# Patient Record
Sex: FEMALE | Race: WHITE | NOT HISPANIC OR LATINO | URBAN - NONMETROPOLITAN AREA
[De-identification: names, ages, dates, MRNs, and addresses within clinical notes are randomized per-mention and may not be internally consistent; named-entity substitution may affect disease eponyms.]

---

## 2018-06-15 PROBLEM — H52.4: Noted: 2018-12-14

## 2018-06-15 PROBLEM — H10.011: Noted: 2018-12-14

## 2018-06-15 PROBLEM — H40.013: Noted: 2018-06-15

## 2018-06-15 PROBLEM — H18.51: Noted: 2018-12-14

## 2018-06-15 PROBLEM — H16.223: Noted: 2018-12-14

## 2018-06-15 PROBLEM — Z96.1: Noted: 2018-12-14

## 2018-06-15 PROBLEM — H35.373: Noted: 2018-12-14

## 2019-06-25 ENCOUNTER — IMPORTED ENCOUNTER (OUTPATIENT)
Dept: URBAN - NONMETROPOLITAN AREA CLINIC 1 | Facility: CLINIC | Age: 74
End: 2019-06-25

## 2019-06-25 PROCEDURE — 92014 COMPRE OPH EXAM EST PT 1/>: CPT

## 2019-06-25 PROCEDURE — 92250 FUNDUS PHOTOGRAPHY W/I&R: CPT

## 2019-06-25 NOTE — PATIENT DISCUSSION
Glaucoma Suspect OU:  -  Discussed findings w/ pt today-  Family hx of glaucoma in aunt-  VF done previously: reliable OD w/ scattered defects; unreliable OS with central scotoma and scattered defects. -  OCT done previously: borderline NFL thinning superior/inferior OD and borderline NFL thinning OS -  PACH done previously:   and .-  optos done today stable OU-  Cup to Disc noted at 0.4 OU. -  IOP 14 OU -  Continue to monitor Conjunctivochalasis OU-  Discussed findings in detail w/ pt today-  Signs/symptoms associated discussed-  Recommended ATs OU BID PRN-  Discussed surgical evaluation/referral will recommend patient to see Dr. Nathaly Curtis for an evaluation if she would like. -  Monitor PRNP/C IOL OU with PCO-  Discussed findings w/ pt today-  Stable doing well-  PCO noted but stable and no treatment needed at this time -  Continue to monitorERM OU-  Discussed findings w/ pt toda-  Signs/symptoms associated with changes discussed-  OCT done previously shows ERM OU but stable from previous -  Will discuss Amsler Grid use in future when needed. -  Monitor yearly or PRN. Guttata/WILLIAM OU mild-  Discussed findings w/ pt today-  Signs/symptoms associated discussed-  Patient would like to restart Restasis patient would like vials -  May have to use steroid in the future if needed or if flare up. -  Patient may try Jodene Money for redness-  Monitor PRN Presbyopia-  Discussed refractive status in detail w/ pt today-  New glasses Rx given today-  Monitor yearly or PRN; 's Notes: MR - AOA - LRI OD / TORIC OSDFE OCT disc 6/15/18 MAC 12/14/18Optos 6/25/19VF Gonio  12/15/17 baselinePACH 12/15/17 - [facility]

## 2019-12-19 ENCOUNTER — IMPORTED ENCOUNTER (OUTPATIENT)
Dept: URBAN - NONMETROPOLITAN AREA CLINIC 1 | Facility: CLINIC | Age: 74
End: 2019-12-19

## 2019-12-19 PROBLEM — H10.011: Noted: 2019-12-19

## 2019-12-19 PROBLEM — H16.223: Noted: 2019-12-19

## 2019-12-19 PROBLEM — H40.013: Noted: 2019-12-19

## 2019-12-19 PROBLEM — H02.834: Noted: 2019-12-19

## 2019-12-19 PROBLEM — Z96.1: Noted: 2019-12-19

## 2019-12-19 PROBLEM — H02.831: Noted: 2019-12-19

## 2019-12-19 PROBLEM — H35.373: Noted: 2019-12-19

## 2019-12-19 PROCEDURE — 99213 OFFICE O/P EST LOW 20 MIN: CPT

## 2019-12-19 PROCEDURE — 92083 EXTENDED VISUAL FIELD XM: CPT

## 2019-12-19 NOTE — PATIENT DISCUSSION
Glaucoma Suspect OU:  -  Discussed findings w/ pt today-  Family hx of glaucoma in aunt-  VF done today OD shows fair field with Early Superior Arcuate and OS shows Good field with Borderline Inferior Defect-  OCT done previously: borderline NFL thinning superior/inferior OD and borderline NFL thinning OS -  PACH done previously:   and . -  Optos done previously stable OU-  Cup to Disc noted at 0.4 OU. -  IOP 13 OU -  Continue to monitor Dermatochalsis OU-  Discussed findings in detail w/ pt today-  Signs/symptoms associated discussed -  Discussed surgical evaluation/referral will recommend patient to see Dr. Alex Mendoza for an evaluation if she would like. -  Monitor PRNP/C IOL OU with PCO-  Discussed findings w/ pt today-  PCO noted but stable and no treatment needed at this time -  Continue to monitorERM OU-  Discussed findings w/ pt toda-  Signs/symptoms associated with changes discussed-  OCT done previously shows ERM OU but stable from previous -  Will discuss Amsler Grid use in future when needed. -  Monitor yearly or PRN. Guttata/WILLIAM OU mild-  Discussed findings w/ pt today-  Signs/symptoms associated discussed-  Patient would like to restart Restasis patient would like vials -  May have to use steroid in the future if needed or if flare up. -  Patient may try Shani First for redness-  Monitor PRN Presbyopia-  Discussed refractive status in detail w/ pt today-  Monitor yearly or PRN; 's Notes: MR - AOA - LRI OD / TORIC OSDFE OCT disc 6/15/18 MAC 12/14/18Optos 6/25/19VF 12/19/19Gonio  12/15/17 baselinePACH 12/15/17 - [facility]

## 2020-07-01 ENCOUNTER — IMPORTED ENCOUNTER (OUTPATIENT)
Dept: URBAN - NONMETROPOLITAN AREA CLINIC 1 | Facility: CLINIC | Age: 75
End: 2020-07-01

## 2020-07-01 PROCEDURE — 99214 OFFICE O/P EST MOD 30 MIN: CPT

## 2020-07-01 PROCEDURE — 92133 CPTRZD OPH DX IMG PST SGM ON: CPT

## 2020-07-01 NOTE — PATIENT DISCUSSION
Glaucoma Suspect OU:  -  Discussed findings w/ pt today-  Family hx of glaucoma in aunt-  VF done previously OD shows fair field with Early Superior Arcuate and OS shows Good field with Borderline Inferior Defect-  OCT done today OD shows Inferior NFL thinning and Borderline Superior NFL thinning and OS shows Borderline Inferior NFL thinning  -  PACH done previously:   and . -  Optos done previously stable OU-  Cup to Disc noted at 0.4 OU. 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 Gigi Grimaldo for an evaluation if she would like. -  Monitor PRNP/C IOL OU with PCO-  Discuseed diagnosis in detail with patient -  PCO OU noted recommend YAG PC eval with Dr. Gigi Grimaldo or Dr. Jarad Valenzuela. Patient agrees with plan -  Patient complains of trouble with glare and does not like at night time anymore-  Continue to monitorERM OU-  Discussed findings w/ pt toda-  Signs/symptoms associated with changes discussed-  OCT done previously shows ERM OU but stable from previous -  Will discuss Amsler Grid use in future when needed. -  Monitor yearly or PRN. Guttata/WILLIAM OU mild-  Discussed findings w/ pt today-  Signs/symptoms associated discussed-  Patient would like to restart Restasis patient would like vials -  May have to use steroid in the future if needed or if flare up. -  Patient may try Gulshan Aubrey for redness-  Monitor PRN Presbyopia-  Discussed refractive status in detail w/ pt today-  Hold RX at this time -  Monitor yearly or PRN; Dr's Notes: MR - AOA - LRI OD / TORIC OSDFE OCT disc 7/1/20Optos 6/25/19VF 12/19/19Gonio  12/15/17 baselinePACH 12/15/17 - [facility]

## 2020-07-20 ENCOUNTER — IMPORTED ENCOUNTER (OUTPATIENT)
Dept: URBAN - NONMETROPOLITAN AREA CLINIC 1 | Facility: CLINIC | Age: 75
End: 2020-07-20

## 2020-07-20 PROBLEM — H35.373: Noted: 2020-07-20

## 2020-07-20 PROBLEM — Z96.1: Noted: 2020-07-20

## 2020-07-20 PROBLEM — H40.013: Noted: 2020-07-20

## 2020-07-20 PROBLEM — H26.493: Noted: 2020-07-20

## 2020-07-20 PROBLEM — H02.831: Noted: 2020-07-20

## 2020-07-20 PROBLEM — H02.834: Noted: 2020-07-20

## 2020-07-20 PROBLEM — H10.011: Noted: 2020-07-20

## 2020-07-20 PROCEDURE — 92014 COMPRE OPH EXAM EST PT 1/>: CPT

## 2020-07-20 PROCEDURE — 66821 AFTER CATARACT LASER SURGERY: CPT

## 2020-07-20 NOTE — PATIENT DISCUSSION
"PCO-Explained PCO and RBAs of YAG Capsulotomy to pt. -Pt elects to proceed. YAG Caps OD today and YAG Caps OS in 1-2 weeks. --------------------notes below are from previous------------------------------Glaucoma Suspect OU:  -  Discussed findings w/ pt today-  Family hx of glaucoma in aunt-  VF done previously OD shows fair field with Early Superior Arcuate and OS shows Good field with Borderline Inferior Defect-  OCT done today OD shows Inferior NFL thinning and Borderline Superior NFL thinning and OS shows Borderline Inferior NFL thinning  -  PACH done previously:   and . -  Optos done previously stable OU-  Cup to Disc noted at 0.4 OU. 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 Alex Mendoza for an evaluation if she would like. -  Monitor PRNP/C IOL OU with PCO-  Discuseed diagnosis in detail with patient -  PCO OU noted recommend YAG PC eval with Dr. Alex Mendoza or Dr. Marycarmen Lazo. Patient agrees with plan -  Patient complains of trouble with glare and does not like at night time anymore-  Continue to monitorERM OU-  Discussed findings w/ pt toda-  Signs/symptoms associated with changes discussed-  OCT done previously shows ERM OU but stable from previous -  Will discuss Amsler Grid use in future when needed. -  Monitor yearly or PRN. Guttata/WILLIAM OU mild-  Discussed findings w/ pt today-  Signs/symptoms associated discussed-  Patient would like to restart Restasis patient would like vials -  May have to use steroid in the future if needed or if flare up. -  Patient may try Shani First for redness-  Monitor PRN Presbyopia-  Discussed refractive status in detail w/ pt today-  Hold RX at this time -  Monitor yearly or PRN ""; Dr's Notes: MR - AOA - LRI OD / TORIC OSDFE OCT disc 7/1/20Optos 6/25/19VF 12/19/19Gonio  12/15/17 baselinePACH 12/15/17 - [facility]"

## 2020-08-17 ENCOUNTER — IMPORTED ENCOUNTER (OUTPATIENT)
Dept: URBAN - NONMETROPOLITAN AREA CLINIC 1 | Facility: CLINIC | Age: 75
End: 2020-08-17

## 2020-08-17 PROBLEM — Z96.1: Noted: 2020-07-20

## 2020-08-17 PROBLEM — H40.013: Noted: 2020-07-20

## 2020-08-17 PROBLEM — H02.831: Noted: 2020-07-20

## 2020-08-17 PROBLEM — H26.492: Noted: 2020-08-17

## 2020-08-17 PROBLEM — H35.373: Noted: 2020-07-20

## 2020-08-17 PROBLEM — H02.834: Noted: 2020-07-20

## 2020-08-17 PROBLEM — H10.011: Noted: 2020-07-20

## 2020-08-17 PROCEDURE — 66821 AFTER CATARACT LASER SURGERY: CPT

## 2020-08-17 NOTE — PATIENT DISCUSSION
"PCO-Explained PCO and RBAs of YAG Capsulotomy to pt. -Pt elects to proceed. YAG Caps OS today. Written consent signed and obtained prior to procedure-s/p YAG PC OD with open capsule noted and improvement of vision-Pt has multiple occasions complained about her RX glasses and did again today. After looking at her lens there is a distortion of the coating she denies washing with anything other than soap or water. Per patient glasses are about 3years old and bought at another doctors office. Patient to have Refraction @ her POV. She continues to say she sees better without glasses. Informed patient if s/p YAG PC OU she feels she sees more clear without glasses she is to relay that information to Dr Kei Welch and they can discuss wether patient needs new RX or is ok to use otc readers only. --------------------notes below are from previous------------------------------Glaucoma Suspect OU:  -  Discussed findings w/ pt today-  Family hx of glaucoma in aunt-  VF done previously OD shows fair field with Early Superior Arcuate and OS shows Good field with Borderline Inferior Defect-  OCT done today OD shows Inferior NFL thinning and Borderline Superior NFL thinning and OS shows Borderline Inferior NFL thinning  -  PACH done previously:   and . -  Optos done previously stable OU-  Cup to Disc noted at 0.4 OU. 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 Radha Melo for an evaluation if she would like. -  Monitor PRNP/C IOL OU with PCO-  Discuseed diagnosis in detail with patient -  PCO OU noted recommend YAG PC eval with Dr. Radha Melo or Dr. Daisy Paez. Patient agrees with plan -  Patient complains of trouble with glare and does not like at night time anymore-  Continue to monitorERM OU-  Discussed findings w/ pt toda-  Signs/symptoms associated with changes discussed-  OCT done previously shows ERM OU but stable from previous -  Will discuss Amsler Grid use in future when needed. -  Monitor yearly or PRN. Guttata/WILLIAM OU mild-  Discussed findings w/ pt today-  Signs/symptoms associated discussed-  Patient would like to restart Restasis patient would like vials -  May have to use steroid in the future if needed or if flare up. -  Patient may try Alphonza Blower for redness-  Monitor PRN Presbyopia-  Discussed refractive status in detail w/ pt today-  Hold RX at this time -  Monitor yearly or PRN ""; Dr's Notes: MR - AOA - LRI OD / TORIC OSDFE OCT disc 7/1/20Optos 6/25/19VF 12/19/19Gonio  12/15/17 baselinePACH 12/15/17 - [facility]"

## 2020-09-03 ENCOUNTER — IMPORTED ENCOUNTER (OUTPATIENT)
Dept: URBAN - NONMETROPOLITAN AREA CLINIC 1 | Facility: CLINIC | Age: 75
End: 2020-09-03

## 2020-09-03 PROBLEM — Z98.890: Noted: 2020-09-03

## 2020-09-03 PROBLEM — H35.373: Noted: 2020-07-20

## 2020-09-03 PROBLEM — H02.831: Noted: 2020-07-20

## 2020-09-03 PROBLEM — H10.011: Noted: 2020-07-20

## 2020-09-03 PROBLEM — Z96.1: Noted: 2020-07-20

## 2020-09-03 PROBLEM — H40.013: Noted: 2020-07-20

## 2020-09-03 PROBLEM — H02.834: Noted: 2020-07-20

## 2020-09-03 PROCEDURE — 92015 DETERMINE REFRACTIVE STATE: CPT

## 2020-09-03 PROCEDURE — 99024 POSTOP FOLLOW-UP VISIT: CPT

## 2020-09-03 NOTE — PATIENT DISCUSSION
2 Week POV YAG PC OS 08/17/2020 YAG PC OD 07/20/20 w/MR- Discussed diagnosis in detail with patient - S/P YAG PC OU - Good central opening - MR done today and new glasses RX given - Continue to monitor --------------------notes below are from previous------------------------------Glaucoma Suspect OU:  -  Discussed findings w/ pt today-  Family hx of glaucoma in aunt-  VF done previously OD shows fair field with Early Superior Arcuate and OS shows Good field with Borderline Inferior Defect-  OCT done previously OD shows Inferior NFL thinning and Borderline Superior NFL thinning and OS shows Borderline Inferior NFL thinning  -  PACH done previously:   and . -  Optos done previously stable OU-  Cup to Disc noted at 0.4 OU. 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 Nathaly Curtis for an evaluation if she would like. -  Monitor PRNERM OU-  Discussed findings w/ pt toda-  Signs/symptoms associated with changes discussed-  OCT done previously shows ERM OU but stable from previous -  Will discuss Amsler Grid use in future when needed. -  Monitor yearly or PRN. Guttata/WILLIAM OU mild/ Fuchs Dystophy-  Discussed findings w/ pt today-  Signs/symptoms associated discussed-  Patient would like to restart Restasis patient would like vials -  May have to use steroid in the future if needed or if flare up. -  Patient may try Lumify for redness-  Monitor PRN; 's Notes: MR - AOA - LRI OD / TORIC OSDFE OCT disc 7/1/20Optos 6/25/19VF 12/19/19Gonio  12/15/17 baselinePACH 12/15/17 - [facility]

## 2021-03-05 ENCOUNTER — IMPORTED ENCOUNTER (OUTPATIENT)
Dept: URBAN - NONMETROPOLITAN AREA CLINIC 1 | Facility: CLINIC | Age: 76
End: 2021-03-05

## 2021-03-05 PROBLEM — Z96.1: Noted: 2021-03-05

## 2021-03-05 PROBLEM — H40.013: Noted: 2021-03-05

## 2021-03-05 PROBLEM — H10.011: Noted: 2021-03-05

## 2021-03-05 PROBLEM — H35.373: Noted: 2021-03-05

## 2021-03-05 PROBLEM — H02.413: Noted: 2021-03-05

## 2021-03-05 PROCEDURE — 99214 OFFICE O/P EST MOD 30 MIN: CPT

## 2021-03-05 PROCEDURE — 92083 EXTENDED VISUAL FIELD XM: CPT

## 2021-03-05 NOTE — PATIENT DISCUSSION
Glaucoma Suspect OU:  -  Discussed findings w/ pt today-  Family hx of glaucoma in aunt-  VF done today; early superior arcuate OD and early inferior/temp OS-  OCT done previously OD shows Inferior NFL thinning and Borderline Superior NFL thinning and OS shows Borderline Inferior NFL thinning  -  PACH done previously:   and . -  Optos done previously stable OU-  Cup to Disc noted at 0.4 OU. -  IOP at 14 OU.-  Continue to monitor Ptosis BUL -  Discussed findings in detail w/ pt today-  Signs/symptoms associated discussed -  Discussed surgical evaluation/referral will recommend patient to see Dr. Tata Howard for an evaluation if she would like. -  Monitor PRNERM OU-  Discussed findings w/ pt toda-  Signs/symptoms associated with changes discussed-  OCT done previously shows ERM OU but stable from previous -  Will discuss Amsler Grid use in future when needed. -  Monitor yearly or PRN. Guttata/WILLIAM OU mild/ Fuchs Dystophy-  Discussed findings w/ pt today-  Signs/symptoms associated discussed-  Patient would like to restart Restasis patient would like vials -  May have to use steroid in the future if needed or if flare up. -  Patient may try Lumify for redness-  Monitor PRN P/C IOL OU- Discussed diagosis in detail with patient. - Both intraocular implants in place and stable. - Continue to monitor.; 's Notes: MR Bossman MIRELES - LRI OD / TORIC OSDFE OCT disc 7/1/20Optos 6/25/19VF   3/5/21Gonio  12/15/17 baselinePACH 12/15/17 - [facility] [employer]

## 2021-04-21 ENCOUNTER — IMPORTED ENCOUNTER (OUTPATIENT)
Dept: URBAN - NONMETROPOLITAN AREA CLINIC 1 | Facility: CLINIC | Age: 76
End: 2021-04-21

## 2021-04-21 PROBLEM — H10.011: Noted: 2021-03-05

## 2021-04-21 PROBLEM — H35.373: Noted: 2021-03-05

## 2021-04-21 PROBLEM — H02.413: Noted: 2021-04-21

## 2021-04-21 PROBLEM — Z96.1: Noted: 2021-03-05

## 2021-04-21 PROBLEM — H40.013: Noted: 2021-04-21

## 2021-04-21 PROCEDURE — 92012 INTRM OPH EXAM EST PATIENT: CPT

## 2021-04-21 NOTE — PATIENT DISCUSSION
Ptosis RUL > SUNDEEP -Ptosis (the upper eyelid being in a lower than normal position) of the upper eyelid was explained to the patient. -RBAs of ptosis repair discussed with patient. Treatment options include observation or surgical correction.-Due to affect of dermatochalasis on aesthetic outcome recommend pt have blepharoplasty during ptosis repair.-Will order ptosis VF and external photos and review results with patient. -MR-1 OD 1 OS1-BLF 14 OU TBUT 13 secs(-) LAG OU Stop all blood thinners x 3 weeksSaundra to schedule Discussed RUL drooping more than SUNDEEP and possible uneviness -Valium RX given in office; Dr's Notes: MR - AOA - LRI OD / TORIC OSDFE OCT disc 7/1/20Optos 6/25/19VF   3/5/21Gonio  12/15/17 baselinePACH 12/15/17 - [facility]

## 2021-05-06 ENCOUNTER — IMPORTED ENCOUNTER (OUTPATIENT)
Dept: URBAN - NONMETROPOLITAN AREA CLINIC 1 | Facility: CLINIC | Age: 76
End: 2021-05-06

## 2021-05-06 PROCEDURE — 92083 EXTENDED VISUAL FIELD XM: CPT

## 2022-04-10 ASSESSMENT — VISUAL ACUITY
OD_SC: 20/40+2
OD_GLARE: 20/400
OS_GLARE: 20/50
OS_SC: 20/30
OD_GLARE: 20/400
OS_GLARE: 20/50
OS_SC: 20/30 SLOW
OD_CC: 20/20 SLOW
OS_CC: 20/20-1
OS_SC: 20/32-
OD_SC: 20/40
OS_CC: 20/30+2
OD_SC: 20/22+2
OS_SC: 20/25+
OS_GLARE: 20/29+
OD_SC: 20/20
OS_SC: 20/25
OD_SC: 20/30-1 SLOW
OD_CC: 20/30+
OD_SC: 20/32-
OS_CC: 20/30
OS_CC: 20/25
OS_CC: 20/22
OS_SC: 20/20 (SLOW)

## 2022-04-10 ASSESSMENT — TONOMETRY
OS_IOP_MMHG: 14
OD_IOP_MMHG: 13
OS_IOP_MMHG: 14
OD_IOP_MMHG: 13
OS_IOP_MMHG: 14
OS_IOP_MMHG: 14
OS_IOP_MMHG: 13
OS_IOP_MMHG: 14
OD_IOP_MMHG: 14
OD_IOP_MMHG: 11
OD_IOP_MMHG: 13
OS_IOP_MMHG: 13
OD_IOP_MMHG: 14
OD_IOP_MMHG: 14

## 2022-04-10 ASSESSMENT — PACHYMETRY
OS_CT_UM: 515; ADJ: VTHIN
OS_CT_UM: 515; ADJ: VTHIN
OD_CT_UM: 518; ADJ: THIN
OD_CT_UM: 518; ADJ: THIN
OS_CT_UM: 515; ADJ: VTHIN
OD_CT_UM: 518; ADJ: THIN
OS_CT_UM: 515; ADJ: VTHIN
OS_CT_UM: 515; ADJ: VTHIN
OD_CT_UM: 518; ADJ: THIN
OD_CT_UM: 518; ADJ: THIN
OS_CT_UM: 515; ADJ: VTHIN
OS_CT_UM: 515; ADJ: VTHIN
OD_CT_UM: 518; ADJ: THIN
OS_CT_UM: 515; ADJ: VTHIN
OD_CT_UM: 518; ADJ: THIN
OD_CT_UM: 518; ADJ: THIN